# Patient Record
Sex: MALE | Race: BLACK OR AFRICAN AMERICAN | Employment: FULL TIME | URBAN - METROPOLITAN AREA
[De-identification: names, ages, dates, MRNs, and addresses within clinical notes are randomized per-mention and may not be internally consistent; named-entity substitution may affect disease eponyms.]

---

## 2020-04-15 ENCOUNTER — APPOINTMENT (OUTPATIENT)
Dept: CT IMAGING | Age: 45
End: 2020-04-15
Payer: COMMERCIAL

## 2020-04-15 ENCOUNTER — APPOINTMENT (OUTPATIENT)
Dept: GENERAL RADIOLOGY | Age: 45
End: 2020-04-15
Payer: COMMERCIAL

## 2020-04-15 ENCOUNTER — HOSPITAL ENCOUNTER (EMERGENCY)
Age: 45
Discharge: HOME | End: 2020-04-15
Attending: EMERGENCY MEDICINE
Payer: COMMERCIAL

## 2020-04-15 VITALS
OXYGEN SATURATION: 95 % | TEMPERATURE: 98.4 F | HEIGHT: 72 IN | SYSTOLIC BLOOD PRESSURE: 143 MMHG | WEIGHT: 200 LBS | BODY MASS INDEX: 27.09 KG/M2 | HEART RATE: 75 BPM | RESPIRATION RATE: 18 BRPM | DIASTOLIC BLOOD PRESSURE: 90 MMHG

## 2020-04-15 LAB
ALBUMIN SERPL-MCNC: 4.4 GM/DL (ref 3.4–5)
ALCOHOL SCREEN SERUM: NORMAL %WT/VOL
ALP BLD-CCNC: 77 IU/L (ref 40–129)
ALT SERPL-CCNC: 47 U/L (ref 10–40)
AMPHETAMINES: NEGATIVE
ANION GAP SERPL CALCULATED.3IONS-SCNC: 18 MMOL/L (ref 4–16)
AST SERPL-CCNC: 32 IU/L (ref 15–37)
BACTERIA: NEGATIVE /HPF
BARBITURATE SCREEN URINE: NEGATIVE
BASOPHILS ABSOLUTE: 0.1 K/CU MM
BASOPHILS RELATIVE PERCENT: 0.6 % (ref 0–1)
BENZODIAZEPINE SCREEN, URINE: NEGATIVE
BILIRUB SERPL-MCNC: 0.3 MG/DL (ref 0–1)
BILIRUBIN URINE: NEGATIVE MG/DL
BLOOD, URINE: ABNORMAL
BUN BLDV-MCNC: 8 MG/DL (ref 6–23)
CALCIUM SERPL-MCNC: 8.8 MG/DL (ref 8.3–10.6)
CANNABINOID SCREEN URINE: ABNORMAL
CHLORIDE BLD-SCNC: 94 MMOL/L (ref 99–110)
CLARITY: CLEAR
CO2: 19 MMOL/L (ref 21–32)
COCAINE METABOLITE: ABNORMAL
COLOR: YELLOW
CREAT SERPL-MCNC: 1 MG/DL (ref 0.9–1.3)
DIFFERENTIAL TYPE: ABNORMAL
EOSINOPHILS ABSOLUTE: 0.3 K/CU MM
EOSINOPHILS RELATIVE PERCENT: 2.3 % (ref 0–3)
GFR AFRICAN AMERICAN: >60 ML/MIN/1.73M2
GFR NON-AFRICAN AMERICAN: >60 ML/MIN/1.73M2
GLUCOSE BLD-MCNC: 205 MG/DL (ref 70–99)
GLUCOSE, URINE: NEGATIVE MG/DL
HCT VFR BLD CALC: 44.6 % (ref 42–52)
HEMOGLOBIN: 15 GM/DL (ref 13.5–18)
HYALINE CASTS: 10 /LPF
IMMATURE NEUTROPHIL %: 0.8 % (ref 0–0.43)
KETONES, URINE: NEGATIVE MG/DL
LEUKOCYTE ESTERASE, URINE: NEGATIVE
LIPASE: 162 IU/L (ref 13–60)
LYMPHOCYTES ABSOLUTE: 3 K/CU MM
LYMPHOCYTES RELATIVE PERCENT: 25.4 % (ref 24–44)
MAGNESIUM: 1.9 MG/DL (ref 1.8–2.4)
MCH RBC QN AUTO: 30.2 PG (ref 27–31)
MCHC RBC AUTO-ENTMCNC: 33.6 % (ref 32–36)
MCV RBC AUTO: 89.9 FL (ref 78–100)
MONOCYTES ABSOLUTE: 0.8 K/CU MM
MONOCYTES RELATIVE PERCENT: 6.6 % (ref 0–4)
MUCUS: ABNORMAL HPF
NITRITE URINE, QUANTITATIVE: NEGATIVE
NUCLEATED RBC %: 0 %
OPIATES, URINE: NEGATIVE
OXYCODONE: ABNORMAL
PDW BLD-RTO: 12.3 % (ref 11.7–14.9)
PH, URINE: 5 (ref 5–8)
PHENCYCLIDINE, URINE: NEGATIVE
PLATELET # BLD: 235 K/CU MM (ref 140–440)
PMV BLD AUTO: 9.1 FL (ref 7.5–11.1)
POTASSIUM SERPL-SCNC: 3.1 MMOL/L (ref 3.5–5.1)
PRO-BNP: 54.04 PG/ML
PROTEIN UA: 30 MG/DL
RBC # BLD: 4.96 M/CU MM (ref 4.6–6.2)
RBC URINE: 5 /HPF (ref 0–3)
SEGMENTED NEUTROPHILS ABSOLUTE COUNT: 7.6 K/CU MM
SEGMENTED NEUTROPHILS RELATIVE PERCENT: 64.3 % (ref 36–66)
SODIUM BLD-SCNC: 131 MMOL/L (ref 135–145)
SPECIFIC GRAVITY UA: 1.01 (ref 1–1.03)
SPERM: ABNORMAL /HFP
SQUAMOUS EPITHELIAL: <1 /HPF
TOTAL CK: 198 IU/L (ref 38–174)
TOTAL IMMATURE NEUTOROPHIL: 0.09 K/CU MM
TOTAL NUCLEATED RBC: 0 K/CU MM
TOTAL PROTEIN: 7.6 GM/DL (ref 6.4–8.2)
TRICHOMONAS: ABNORMAL /HPF
TROPONIN T: <0.01 NG/ML
UROBILINOGEN, URINE: NORMAL MG/DL (ref 0.2–1)
WBC # BLD: 11.8 K/CU MM (ref 4–10.5)
WBC UA: 1 /HPF (ref 0–2)

## 2020-04-15 PROCEDURE — 83735 ASSAY OF MAGNESIUM: CPT

## 2020-04-15 PROCEDURE — 99284 EMERGENCY DEPT VISIT MOD MDM: CPT

## 2020-04-15 PROCEDURE — 85025 COMPLETE CBC W/AUTO DIFF WBC: CPT

## 2020-04-15 PROCEDURE — 74174 CTA ABD&PLVS W/CONTRAST: CPT

## 2020-04-15 PROCEDURE — 72125 CT NECK SPINE W/O DYE: CPT

## 2020-04-15 PROCEDURE — 80053 COMPREHEN METABOLIC PANEL: CPT

## 2020-04-15 PROCEDURE — 6370000000 HC RX 637 (ALT 250 FOR IP): Performed by: EMERGENCY MEDICINE

## 2020-04-15 PROCEDURE — 71045 X-RAY EXAM CHEST 1 VIEW: CPT

## 2020-04-15 PROCEDURE — 83690 ASSAY OF LIPASE: CPT

## 2020-04-15 PROCEDURE — 70450 CT HEAD/BRAIN W/O DYE: CPT

## 2020-04-15 PROCEDURE — 80307 DRUG TEST PRSMV CHEM ANLYZR: CPT

## 2020-04-15 PROCEDURE — 2580000003 HC RX 258: Performed by: EMERGENCY MEDICINE

## 2020-04-15 PROCEDURE — 81001 URINALYSIS AUTO W/SCOPE: CPT

## 2020-04-15 PROCEDURE — 6360000004 HC RX CONTRAST MEDICATION: Performed by: EMERGENCY MEDICINE

## 2020-04-15 PROCEDURE — 84484 ASSAY OF TROPONIN QUANT: CPT

## 2020-04-15 PROCEDURE — 93005 ELECTROCARDIOGRAM TRACING: CPT | Performed by: EMERGENCY MEDICINE

## 2020-04-15 PROCEDURE — G0480 DRUG TEST DEF 1-7 CLASSES: HCPCS

## 2020-04-15 PROCEDURE — 83880 ASSAY OF NATRIURETIC PEPTIDE: CPT

## 2020-04-15 PROCEDURE — 82550 ASSAY OF CK (CPK): CPT

## 2020-04-15 RX ORDER — 0.9 % SODIUM CHLORIDE 0.9 %
1000 INTRAVENOUS SOLUTION INTRAVENOUS ONCE
Status: COMPLETED | OUTPATIENT
Start: 2020-04-15 | End: 2020-04-15

## 2020-04-15 RX ORDER — SODIUM CHLORIDE 0.9 % (FLUSH) 0.9 %
10 SYRINGE (ML) INJECTION 2 TIMES DAILY
Status: DISCONTINUED | OUTPATIENT
Start: 2020-04-15 | End: 2020-04-15 | Stop reason: HOSPADM

## 2020-04-15 RX ORDER — ACETAMINOPHEN 325 MG/1
650 TABLET ORAL EVERY 6 HOURS PRN
Qty: 120 TABLET | Refills: 3 | Status: SHIPPED | OUTPATIENT
Start: 2020-04-15

## 2020-04-15 RX ORDER — POTASSIUM CHLORIDE 20 MEQ/1
40 TABLET, EXTENDED RELEASE ORAL ONCE
Status: COMPLETED | OUTPATIENT
Start: 2020-04-15 | End: 2020-04-15

## 2020-04-15 RX ORDER — NAPROXEN 500 MG/1
500 TABLET ORAL 2 TIMES DAILY
Qty: 60 TABLET | Refills: 0 | Status: SHIPPED | OUTPATIENT
Start: 2020-04-15

## 2020-04-15 RX ORDER — MAGNESIUM SULFATE 1 G/100ML
1 INJECTION INTRAVENOUS ONCE
Status: DISCONTINUED | OUTPATIENT
Start: 2020-04-15 | End: 2020-04-15

## 2020-04-15 RX ADMIN — POTASSIUM CHLORIDE 40 MEQ: 1500 TABLET, EXTENDED RELEASE ORAL at 18:57

## 2020-04-15 RX ADMIN — Medication 10 ML: at 19:52

## 2020-04-15 RX ADMIN — IOPAMIDOL 90 ML: 755 INJECTION, SOLUTION INTRAVENOUS at 19:51

## 2020-04-15 RX ADMIN — SODIUM CHLORIDE 1000 ML: 9 INJECTION, SOLUTION INTRAVENOUS at 17:57

## 2020-04-15 ASSESSMENT — ENCOUNTER SYMPTOMS
ALLERGIC/IMMUNOLOGIC NEGATIVE: 1
GASTROINTESTINAL NEGATIVE: 1
EYES NEGATIVE: 1
RESPIRATORY NEGATIVE: 1
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
## 2020-04-15 NOTE — ED NOTES
Bed: 03TR-03  Expected date:   Expected time:   Means of arrival:   Comments:  [address], RN  04/15/20 [id number]
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
## 2020-04-16 LAB
EKG ATRIAL RATE: 95 BPM
EKG DIAGNOSIS: NORMAL
EKG P AXIS: 63 DEGREES
EKG P-R INTERVAL: 144 MS
EKG Q-T INTERVAL: 384 MS
EKG QRS DURATION: 98 MS
EKG QTC CALCULATION (BAZETT): 482 MS
EKG R AXIS: -7 DEGREES
EKG T AXIS: 47 DEGREES
EKG VENTRICULAR RATE: 95 BPM

## 2020-04-16 PROCEDURE — 93010 ELECTROCARDIOGRAM REPORT: CPT | Performed by: INTERNAL MEDICINE
